# Patient Record
Sex: MALE | Race: WHITE | NOT HISPANIC OR LATINO | URBAN - METROPOLITAN AREA
[De-identification: names, ages, dates, MRNs, and addresses within clinical notes are randomized per-mention and may not be internally consistent; named-entity substitution may affect disease eponyms.]

---

## 2024-02-21 ENCOUNTER — EMERGENCY (EMERGENCY)
Facility: HOSPITAL | Age: 69
LOS: 1 days | Discharge: HOME | End: 2024-02-21
Attending: EMERGENCY MEDICINE | Admitting: EMERGENCY MEDICINE
Payer: MEDICARE

## 2024-02-21 VITALS
HEART RATE: 86 BPM | DIASTOLIC BLOOD PRESSURE: 85 MMHG | RESPIRATION RATE: 18 BRPM | SYSTOLIC BLOOD PRESSURE: 159 MMHG | TEMPERATURE: 98 F | OXYGEN SATURATION: 99 %

## 2024-02-21 PROCEDURE — 99284 EMERGENCY DEPT VISIT MOD MDM: CPT | Mod: 25,GC

## 2024-02-21 PROCEDURE — 93010 ELECTROCARDIOGRAM REPORT: CPT

## 2024-02-21 PROCEDURE — 12013 RPR F/E/E/N/L/M 2.6-5.0 CM: CPT | Mod: GC

## 2024-02-21 NOTE — ED ADULT TRIAGE NOTE - CHIEF COMPLAINT QUOTE
Pt c/o R forehead laceration s/p possible slip and fall. Reports has had unsteady gait x1 year. hx "neurological disorder?".  Denies LOC, chest pain, blood thinners, n/v

## 2024-02-22 VITALS
HEART RATE: 77 BPM | RESPIRATION RATE: 18 BRPM | DIASTOLIC BLOOD PRESSURE: 89 MMHG | SYSTOLIC BLOOD PRESSURE: 134 MMHG | TEMPERATURE: 99 F | OXYGEN SATURATION: 100 %

## 2024-02-22 PROCEDURE — 70450 CT HEAD/BRAIN W/O DYE: CPT | Mod: 26,MD

## 2024-02-22 RX ORDER — ACETAMINOPHEN 500 MG
975 TABLET ORAL ONCE
Refills: 0 | Status: COMPLETED | OUTPATIENT
Start: 2024-02-22 | End: 2024-02-22

## 2024-02-22 RX ORDER — LIDOCAINE HCL 20 MG/ML
20 VIAL (ML) INJECTION ONCE
Refills: 0 | Status: DISCONTINUED | OUTPATIENT
Start: 2024-02-22 | End: 2024-02-25

## 2024-02-22 NOTE — ED PROVIDER NOTE - PROGRESS NOTE DETAILS
Yulisa Landry MD; [facility] PGY4: CTH with NPH, discussed results with patient, has f/u with [facility] but will give information for [facility] neurology. Will noni.

## 2024-02-22 NOTE — ED PROVIDER NOTE - OBJECTIVE STATEMENT
68-year-old male past medical history of BPH, hypothyroidism, neurologic disorder that has not been fully worked up as of yet (causes gait disturbances, daytime somnolence, cognitive decline) here for fall from standing at approximately 9 PM.   Patient endorses that this is mechanical in nature states that he tripped over his own 2 feet fell forward and hit the right side of his forehead against the ground sustained laceration that was actively bleeding.  Patient is not on blood thinners, denies any loss of consciousness.  Able to ambulate after the event without difficulty.  Denies any changes in vision, changes in sensation in the face, neck pain. As per wife at b/l. 68-year-old male past medical history of BPH, hypothyroidism, neurologic disorder that has not been fully worked up as of yet (causes gait disturbances, daytime somnolence, cognitive decline) here for fall from standing at approximately 9 PM.   Patient endorses that this is mechanical in nature states that he tripped over his own 2 feet fell forward and hit the right side of his forehead against the ground sustained laceration that was actively bleeding.  Patient is not on blood thinners, denies any loss of consciousness.  Able to ambulate after the event without difficulty.  Denies any changes in vision, changes in sensation in the face, neck pain. As per wife at b/l. Last tetanus < 10 years ago. Has h/o empty sella syndrome on previous CT scans.

## 2024-02-22 NOTE — ED ADULT NURSE NOTE - OBJECTIVE STATEMENT
breakcoverage rn LAMARP: pt in ED 7a, wife at bedside. Pt aox4, calm smiling affect . Pt st" I tripped and fell hit my head on floor tonight. I did not black out I am not on blood thinners. I have no pain. " + laceration to rt side forehead. HOB elevated. Pt declining tylenol ordered awaits CT.

## 2024-02-22 NOTE — ED PROVIDER NOTE - NSFOLLOWUPCLINICS_GEN_ALL_ED_FT
[facility]  Neurology  [address] [place] [address] [address] [place] [address]  Phone: [phone]  Fax:

## 2024-02-22 NOTE — ED PROVIDER NOTE - PHYSICAL EXAMINATION
Gen: WDWN, NAD  HEENT: EOMI, no nasal discharge, mucous membranes moist  CV: RRR, +S1/S2, no M/R/G  Resp: CTAB, no W/R/R  GI: Abdomen soft non-distended, NTTP, no masses  MSK: 3 cm laceration of forehead right above r eyebrow not actively bleeding, superficial, no bruising, no LE edema  Neuro: A&Ox4, following commands, moving all four extremities spontaneously  Psych: appropriate mood, denies AH, VH, SI

## 2024-02-22 NOTE — ED PROCEDURE NOTE - ATTENDING CONTRIBUTION TO CARE
DR. KNOTT, ATTENDING MD-  I was in the exam room and observed and supervised the resident when they were completing the key portions of this procedure.

## 2024-02-22 NOTE — ED PROVIDER NOTE - NSFOLLOWUPINSTRUCTIONS_ED_ALL_ED_FT
-- Please follow up with your primary doctor (or come back to the Emergency Department) within 48-72 hours for wound check.   -- Keep sutures/staples covered & dry for 24 hours.  Afterwards, once a day, please clean the injury gently with unscented soap & water, apply bacitracin, and then re-cover the wound.  -- Return to Emergency Department for suture removal in  5-7 days.   -- Remember, a wound doesn't become even close to as strong as normal skin until 6 weeks after the injury.  Please take care to avoid any further trauma to the area to allow the wound to heal.  -- Any increased pain, redness, streaking (red lines), drainage from the wound, swelling, fever, chills please return right away to Emergency Department.  -- You were given a copy of the results from any tests performed today in the Emergency Department which have results available.  Show these to your doctor(s).   Some of the tests we sent may not have results yet so please call or have your doctor call the Emergency Department to follow up on all results.  -- Please continue taking your home medications as directed.  Do not use alcohol when taking any medication (especially antibiotics, tylenol or other pain medication) unless you check with the doctor or pharmacist.

## 2024-02-22 NOTE — ED PROCEDURE NOTE - CPROC ED LACER REPAIR DETAIL1
Acute hypoxic respiratory failure due to [id number].    Initially on NR 15 L now slowly improving, weaned off to 6 L--> 4 L  on optimizer currently saturating 95%   Completed Remdesevir and Dexamethasone  continue bronchodilation  monitor inflammatory markers  adjust oxygen to maintain saturation above 94%  Pt still feels sob on OOB  f/u CTA to r/o PE The wound was explored to base in bloodless field./No foreign body

## 2024-02-22 NOTE — ED PROVIDER NOTE - CLINICAL SUMMARY MEDICAL DECISION MAKING FREE TEXT BOX
68M PMH as above here for forehead laceration after mechanical FFS. VSS, PE as above. Plan for CTH, wound irrigation and reassessment. Likely dc however dispo pending clinical course.

## 2024-02-22 NOTE — ED PROVIDER NOTE - PATIENT PORTAL LINK FT
You can access the FollowMyHealth Patient Portal offered by [facility] by registering at the following website: [email or web address]. By joining [employer]’s FollowMyHealth portal, you will also be able to view your health information using other applications (apps) compatible with our system.

## 2024-02-22 NOTE — ED PROCEDURE NOTE - CPROC ED INFORMED CONSENT1
Benefits, risks, and possible complications of procedure explained to patient/caregiver who verbalized understanding and gave verbal consent. O-Z Flap Text: The defect edges were debeveled with a #15 scalpel blade.  Given the location of the defect, shape of the defect and the proximity to free margins an O-Z flap was deemed most appropriate.  Using a sterile surgical marker, an appropriate transposition flap was drawn incorporating the defect and placing the expected incisions within the relaxed skin tension lines where possible. The area thus outlined was incised deep to adipose tissue with a #15 scalpel blade.  The skin margins were undermined to an appropriate distance in all directions utilizing iris scissors.

## 2024-02-22 NOTE — ED PROVIDER NOTE - ATTENDING CONTRIBUTION TO CARE
HPI: 68-year-old male with past medical history significant for BPH, hypothyroidism, neurological disorder that has not been fully worked up as of yet leading to gait disturbances, daytime somnolence, cognitive decline that is here for fall from standing at approximately 9 PM which is approximately 3 hours prior to arrival.  Patient states that he was turning and had a mechanical fall on his 2 feet fell forward hit the right side of his forehead against the hardwood floor and right elbow.  Patient's wife heard him fall and found him immediately he was awake and alert no tongue biting or seizure-like activities noted.  She noted that his right forehead had a deep laceration and right elbow has an abrasion.  Patient is not on any blood thinners, denies any LOC, denies any other pain such as chest pain, shortness of breath, abdominal pain, nausea, vomiting, diarrhea.  Patient has been otherwise well in usual state of health prior to fall.  Since his fall he has had the ability to ambulate on his own without any difficulty.  Denies any changes in vision, no paresthesias, no neck pain.  Last tetanus was within the last 5 years.    EXAM: Well-appearing no acute distress.  Right elbow full range of motion with abrasion no laceration or open wounds otherwise.  No palpable step-offs.  Full range of motion at right shoulder, elbow, wrist.  Chest wall is stable no C/T/L-spine tenderness to palpation or step-offs.  Abdomen soft nontender.  Right forehead with 2.5 cm linear laceration.  No muscle involvement.  No tendon seen minimal oozing from wound.  No pus no foreign body.  Able to for all bowel.  CN II through XII are intact.  Gait is steady.    MDM: 68-year-old male with past medical history significant for BPH, hypothyroidism, neurological disorder leading to gait disturbances at baseline that had a mechanical slip and fall tonight leading to right forehead laceration and right elbow abrasion.  Patient is alert and awake.  Denies any other pains or symptoms.  Tdap within the last 5 years.  Will at this time provide pain medications obtain CT imaging of the head but not the cervical spine as patient has no cervical spinal tenderness.  Patient has no other neurological deficits or gross musculoskeletal deformities.  His cranial nerves are intact otherwise.  Will obtain CT imaging suture laceration provide pain medications and if normal CT head will be able to discharge home to follow-up with outpatient PMD.  Patient and wife are aware of plan.

## 2024-07-08 NOTE — ED ADULT TRIAGE NOTE - PAIN RATING/NUMBER SCALE (0-10): ACTIVITY
Refill must be reviewed and completed by the office or provider. The refill is unable to be approved or denied by the medication management team.      Patient last seen 5.2023, no appointment - Please review to see if the refill is appropriate.   
0 (no pain/absence of nonverbal indicators of pain)

## 2024-08-06 ENCOUNTER — INPATIENT (INPATIENT)
Facility: HOSPITAL | Age: 69
LOS: 2 days | Discharge: HOME | End: 2024-08-09
Attending: STUDENT IN AN ORGANIZED HEALTH CARE EDUCATION/TRAINING PROGRAM | Admitting: STUDENT IN AN ORGANIZED HEALTH CARE EDUCATION/TRAINING PROGRAM
Payer: MEDICARE

## 2024-08-06 VITALS
DIASTOLIC BLOOD PRESSURE: 75 MMHG | SYSTOLIC BLOOD PRESSURE: 132 MMHG | WEIGHT: 173.06 LBS | HEART RATE: 104 BPM | RESPIRATION RATE: 20 BRPM | HEIGHT: 68 IN | TEMPERATURE: 102 F | OXYGEN SATURATION: 100 %

## 2024-08-06 LAB
ADD ON TEST-SPECIMEN IN LAB: SIGNIFICANT CHANGE UP
ALBUMIN SERPL ELPH-MCNC: 4.1 G/DL — SIGNIFICANT CHANGE UP (ref 3.3–5)
ALP SERPL-CCNC: 104 U/L — SIGNIFICANT CHANGE UP (ref 40–120)
ALT FLD-CCNC: 39 U/L — SIGNIFICANT CHANGE UP (ref 4–41)
ANION GAP SERPL CALC-SCNC: 12 MMOL/L — SIGNIFICANT CHANGE UP (ref 7–14)
AST SERPL-CCNC: 28 U/L — SIGNIFICANT CHANGE UP (ref 4–40)
BASOPHILS # BLD AUTO: 0.02 K/UL — SIGNIFICANT CHANGE UP (ref 0–0.2)
BASOPHILS NFR BLD AUTO: 0.2 % — SIGNIFICANT CHANGE UP (ref 0–2)
BILIRUB SERPL-MCNC: 0.5 MG/DL — SIGNIFICANT CHANGE UP (ref 0.2–1.2)
BUN SERPL-MCNC: 17 MG/DL — SIGNIFICANT CHANGE UP (ref 7–23)
CALCIUM SERPL-MCNC: 9.2 MG/DL — SIGNIFICANT CHANGE UP (ref 8.4–10.5)
CHLORIDE SERPL-SCNC: 101 MMOL/L — SIGNIFICANT CHANGE UP (ref 98–107)
CO2 SERPL-SCNC: 26 MMOL/L — SIGNIFICANT CHANGE UP (ref 22–31)
CREAT SERPL-MCNC: 1.2 MG/DL — SIGNIFICANT CHANGE UP (ref 0.5–1.3)
EGFR: 65 ML/MIN/1.73M2 — SIGNIFICANT CHANGE UP
EOSINOPHIL # BLD AUTO: 0 K/UL — SIGNIFICANT CHANGE UP (ref 0–0.5)
EOSINOPHIL NFR BLD AUTO: 0 % — SIGNIFICANT CHANGE UP (ref 0–6)
FLUAV AG NPH QL: SIGNIFICANT CHANGE UP
FLUBV AG NPH QL: SIGNIFICANT CHANGE UP
GLUCOSE SERPL-MCNC: 109 MG/DL — HIGH (ref 70–99)
HCT VFR BLD CALC: 45.6 % — SIGNIFICANT CHANGE UP (ref 39–50)
HGB BLD-MCNC: 15 G/DL — SIGNIFICANT CHANGE UP (ref 13–17)
IANC: 7.02 K/UL — SIGNIFICANT CHANGE UP (ref 1.8–7.4)
IMM GRANULOCYTES NFR BLD AUTO: 0.4 % — SIGNIFICANT CHANGE UP (ref 0–0.9)
LYMPHOCYTES # BLD AUTO: 0.74 K/UL — LOW (ref 1–3.3)
LYMPHOCYTES # BLD AUTO: 8.8 % — LOW (ref 13–44)
MCHC RBC-ENTMCNC: 30.6 PG — SIGNIFICANT CHANGE UP (ref 27–34)
MCHC RBC-ENTMCNC: 32.9 GM/DL — SIGNIFICANT CHANGE UP (ref 32–36)
MCV RBC AUTO: 93.1 FL — SIGNIFICANT CHANGE UP (ref 80–100)
MONOCYTES # BLD AUTO: 0.58 K/UL — SIGNIFICANT CHANGE UP (ref 0–0.9)
MONOCYTES NFR BLD AUTO: 6.9 % — SIGNIFICANT CHANGE UP (ref 2–14)
NEUTROPHILS # BLD AUTO: 7.02 K/UL — SIGNIFICANT CHANGE UP (ref 1.8–7.4)
NEUTROPHILS NFR BLD AUTO: 83.7 % — HIGH (ref 43–77)
NRBC # BLD: 0 /100 WBCS — SIGNIFICANT CHANGE UP (ref 0–0)
NRBC # FLD: 0 K/UL — SIGNIFICANT CHANGE UP (ref 0–0)
PLATELET # BLD AUTO: 201 K/UL — SIGNIFICANT CHANGE UP (ref 150–400)
POTASSIUM SERPL-MCNC: 4.1 MMOL/L — SIGNIFICANT CHANGE UP (ref 3.5–5.3)
POTASSIUM SERPL-SCNC: 4.1 MMOL/L — SIGNIFICANT CHANGE UP (ref 3.5–5.3)
PROT SERPL-MCNC: 6.7 G/DL — SIGNIFICANT CHANGE UP (ref 6–8.3)
RBC # BLD: 4.9 M/UL — SIGNIFICANT CHANGE UP (ref 4.2–5.8)
RBC # FLD: 13.3 % — SIGNIFICANT CHANGE UP (ref 10.3–14.5)
RSV RNA NPH QL NAA+NON-PROBE: SIGNIFICANT CHANGE UP
SARS-COV-2 RNA SPEC QL NAA+PROBE: DETECTED
SODIUM SERPL-SCNC: 139 MMOL/L — SIGNIFICANT CHANGE UP (ref 135–145)
WBC # BLD: 8.39 K/UL — SIGNIFICANT CHANGE UP (ref 3.8–10.5)
WBC # FLD AUTO: 8.39 K/UL — SIGNIFICANT CHANGE UP (ref 3.8–10.5)

## 2024-08-06 PROCEDURE — 73522 X-RAY EXAM HIPS BI 3-4 VIEWS: CPT | Mod: 26

## 2024-08-06 PROCEDURE — 99285 EMERGENCY DEPT VISIT HI MDM: CPT

## 2024-08-06 PROCEDURE — 71046 X-RAY EXAM CHEST 2 VIEWS: CPT | Mod: 26

## 2024-08-06 PROCEDURE — 72170 X-RAY EXAM OF PELVIS: CPT | Mod: 26,XE

## 2024-08-06 RX ORDER — AZITHROMYCIN 250 MG
500 TABLET ORAL ONCE
Refills: 0 | Status: COMPLETED | OUTPATIENT
Start: 2024-08-06 | End: 2024-08-06

## 2024-08-06 RX ORDER — ACETAMINOPHEN 500 MG
650 TABLET ORAL ONCE
Refills: 0 | Status: COMPLETED | OUTPATIENT
Start: 2024-08-06 | End: 2024-08-06

## 2024-08-06 RX ADMIN — Medication 255 MILLIGRAM(S): at 22:31

## 2024-08-06 RX ADMIN — Medication 100 MILLIGRAM(S): at 21:34

## 2024-08-06 RX ADMIN — Medication 650 MILLIGRAM(S): at 23:27

## 2024-08-06 NOTE — ED ADULT NURSE NOTE - OBJECTIVE STATEMENT
This is a 69 y/o male complaining of cough and fever for a few days. Alert and oriented x 4. Past medical history of hypothyroid and BPH. Pale looking, no SOB noted, chest is clear bilaterally, abdomen is soft non tender on palpation. IV initiated on left AC g 20 blood work sent to lab. Denies urinary symptoms, normal bowel movement as stated by patient. Ambulatory no assist noted.

## 2024-08-06 NOTE — ED PROVIDER NOTE - PROGRESS NOTE DETAILS
Patient with covid 19 and pna. Offered patient admission vs discharge but would prefer admission as had issues with covid in the past. Discussed with hospitalist. TBA.  Peter Moreno PGY-3

## 2024-08-06 NOTE — ED PROVIDER NOTE - PHYSICAL EXAMINATION
GEN - NAD; well appearing; A+O x3   HEAD - NC/AT   EYES- PERRL, EOMI  ENT: Airway patent, mmm  NECK: Neck supple  PULMONARY - crackles at bases otherwise clear   CARDIAC -s1s2, RRR, no M,G,R  ABDOMEN - +BS, ND, NT, soft, no guarding, no rebound, no masses   EXTREMITIES - FROM  NEUROLOGIC - alert, speech clear  PSYCH -nl mood/affect, nl insight.

## 2024-08-06 NOTE — ED PROVIDER NOTE - CLINICAL SUMMARY MEDICAL DECISION MAKING FREE TEXT BOX
With history of hypothyroid, NPH with  shunt presents the emergency department with 3 days of fatigue, nasal congestion, dry cough.  Patient presents with his wife who states that earlier today he was very lethargic and had difficulty staying awake.  He is currently mentating appropriately, nonfocal neurological exam, denies headache, neck pain.  Appears nontoxic but does have COVID, was swabbed at home which came back positive according to wife.  No respiratory distress here, plan for chest x-ray to evaluate for possible overlying pneumonia, basic labs to evaluate electrolytes, kidney function, blood counts, will also give IV hydration as patient has not had anything to eat today.

## 2024-08-06 NOTE — ED ADULT TRIAGE NOTE - CHIEF COMPLAINT QUOTE
Pt arrives with EMS. As per wife, patient became weak and felt like he had a fever. Pt wife says she tested her  and he was found to be COVID positive. Pt was not responding as he usually does and is febrile in triage.  Speech clear at this time. Pt starting to answer questions. Hx  shunt 6/24, hypothyroidism.

## 2024-08-06 NOTE — ED ADULT NURSE REASSESSMENT NOTE - NS ED NURSE REASSESS COMMENT FT1
pt able to walk with steady gait, ambulatory o2 sat 97% on RA, RR even and unlabored, denies sob upon exertion. iv abx infusing well without complications, iv site WNL. pt requesting po tylenol d/t facial pain. tolerated po med well with water, no coughing noted. safety measures maintained, wife at bedside

## 2024-08-06 NOTE — ED ADULT NURSE REASSESSMENT NOTE - NS ED NURSE REASSESS COMMENT FT1
report received from day shift ED RN. pt resting in stretcher in NAD, A&OX4, RR even and unlabored. denies pain or sob at this time. o2 sat 97% when laying down. safety measures maintained, side rails up x2. wife at bedside. proper precaution signage posted on door.

## 2024-08-06 NOTE — ED ADULT NURSE REASSESSMENT NOTE - NS ED NURSE REASSESS COMMENT FT1
Patient taken fro x-ray via stretcher. Vital sing stable, afebrile. No verbal complaints at this time.

## 2024-08-07 DIAGNOSIS — Z29.9 ENCOUNTER FOR PROPHYLACTIC MEASURES, UNSPECIFIED: ICD-10-CM

## 2024-08-07 DIAGNOSIS — G91.2 (IDIOPATHIC) NORMAL PRESSURE HYDROCEPHALUS: ICD-10-CM

## 2024-08-07 DIAGNOSIS — Z79.899 OTHER LONG TERM (CURRENT) DRUG THERAPY: ICD-10-CM

## 2024-08-07 DIAGNOSIS — U07.1 COVID-19: ICD-10-CM

## 2024-08-07 DIAGNOSIS — A41.9 SEPSIS, UNSPECIFIED ORGANISM: ICD-10-CM

## 2024-08-07 DIAGNOSIS — R63.8 OTHER SYMPTOMS AND SIGNS CONCERNING FOOD AND FLUID INTAKE: ICD-10-CM

## 2024-08-07 DIAGNOSIS — E03.9 HYPOTHYROIDISM, UNSPECIFIED: ICD-10-CM

## 2024-08-07 DIAGNOSIS — J13 PNEUMONIA DUE TO STREPTOCOCCUS PNEUMONIAE: ICD-10-CM

## 2024-08-07 DIAGNOSIS — Z98.2 PRESENCE OF CEREBROSPINAL FLUID DRAINAGE DEVICE: Chronic | ICD-10-CM

## 2024-08-07 LAB
MRSA PCR RESULT.: SIGNIFICANT CHANGE UP
S AUREUS DNA NOSE QL NAA+PROBE: SIGNIFICANT CHANGE UP

## 2024-08-07 PROCEDURE — 71250 CT THORAX DX C-: CPT | Mod: 26

## 2024-08-07 PROCEDURE — 99223 1ST HOSP IP/OBS HIGH 75: CPT

## 2024-08-07 RX ORDER — MODAFINIL 200 MG
1.5 TABLET ORAL
Refills: 0 | DISCHARGE

## 2024-08-07 RX ORDER — IBUPROFEN 200 MG
400 TABLET ORAL EVERY 6 HOURS
Refills: 0 | Status: DISCONTINUED | OUTPATIENT
Start: 2024-08-07 | End: 2024-08-09

## 2024-08-07 RX ORDER — MELATONIN 3 MG
3 TABLET ORAL AT BEDTIME
Refills: 0 | Status: DISCONTINUED | OUTPATIENT
Start: 2024-08-07 | End: 2024-08-09

## 2024-08-07 RX ORDER — AZITHROMYCIN 250 MG
500 TABLET ORAL EVERY 24 HOURS
Refills: 0 | Status: DISCONTINUED | OUTPATIENT
Start: 2024-08-07 | End: 2024-08-09

## 2024-08-07 RX ORDER — LEVOTHYROXINE SODIUM 175 MCG
50 TABLET ORAL DAILY
Refills: 0 | Status: DISCONTINUED | OUTPATIENT
Start: 2024-08-07 | End: 2024-08-09

## 2024-08-07 RX ORDER — LEVOTHYROXINE SODIUM 175 MCG
1 TABLET ORAL
Refills: 0 | DISCHARGE

## 2024-08-07 RX ORDER — MELATONIN 3 MG
3 TABLET ORAL AT BEDTIME
Refills: 0 | Status: DISCONTINUED | OUTPATIENT
Start: 2024-08-07 | End: 2024-08-07

## 2024-08-07 RX ORDER — BENZONATATE 100 MG/1
100 CAPSULE, LIQUID FILLED ORAL THREE TIMES A DAY
Refills: 0 | Status: DISCONTINUED | OUTPATIENT
Start: 2024-08-07 | End: 2024-08-07

## 2024-08-07 RX ORDER — DUTASTERIDE 0.5 MG/1
1 CAPSULE, LIQUID FILLED ORAL
Refills: 0 | DISCHARGE

## 2024-08-07 RX ORDER — ACETAMINOPHEN 500 MG
650 TABLET ORAL EVERY 6 HOURS
Refills: 0 | Status: DISCONTINUED | OUTPATIENT
Start: 2024-08-07 | End: 2024-08-09

## 2024-08-07 RX ORDER — MAGNESIUM, ALUMINUM HYDROXIDE 200-225/5
30 SUSPENSION, ORAL (FINAL DOSE FORM) ORAL EVERY 4 HOURS
Refills: 0 | Status: DISCONTINUED | OUTPATIENT
Start: 2024-08-07 | End: 2024-08-07

## 2024-08-07 RX ORDER — ENOXAPARIN SODIUM 120 MG/.8ML
40 INJECTION SUBCUTANEOUS EVERY 24 HOURS
Refills: 0 | Status: DISCONTINUED | OUTPATIENT
Start: 2024-08-07 | End: 2024-08-09

## 2024-08-07 RX ORDER — RITONAVIR 100 MG 100 MG/1
100 TABLET ORAL
Refills: 0 | Status: DISCONTINUED | OUTPATIENT
Start: 2024-08-07 | End: 2024-08-07

## 2024-08-07 RX ORDER — ALBUTEROL 90 MCG
2 AEROSOL REFILL (GRAM) INHALATION EVERY 6 HOURS
Refills: 0 | Status: DISCONTINUED | OUTPATIENT
Start: 2024-08-07 | End: 2024-08-09

## 2024-08-07 RX ORDER — ONDANSETRON HCL/PF 4 MG/2 ML
4 VIAL (ML) INJECTION EVERY 8 HOURS
Refills: 0 | Status: DISCONTINUED | OUTPATIENT
Start: 2024-08-07 | End: 2024-08-07

## 2024-08-07 RX ORDER — BUPROPION HCL 100 MG
1 TABLET,SUSTAINED-RELEASE 12 HR ORAL
Refills: 0 | DISCHARGE

## 2024-08-07 RX ADMIN — Medication 650 MILLIGRAM(S): at 22:17

## 2024-08-07 RX ADMIN — Medication 255 MILLIGRAM(S): at 22:09

## 2024-08-07 RX ADMIN — Medication 650 MILLIGRAM(S): at 23:00

## 2024-08-07 RX ADMIN — ENOXAPARIN SODIUM 40 MILLIGRAM(S): 120 INJECTION SUBCUTANEOUS at 07:30

## 2024-08-07 RX ADMIN — Medication 100 MILLIGRAM(S): at 21:24

## 2024-08-07 RX ADMIN — Medication 50 MICROGRAM(S): at 07:30

## 2024-08-07 NOTE — PHYSICAL THERAPY INITIAL EVALUATION ADULT - ADDITIONAL COMMENTS
Pt lives in an apartment with wife, +1 flight walk-up, was independent with all functional mobility and ADL performance using a Single Axis Cane only for long distances in the community, otherwise does not use a device.     Pt left semi-supine in bed, all lines intact, all needs in reach, bed alarm set, in NAD. BHANU Duong aware. Heart rate 72 beats per minute

## 2024-08-07 NOTE — PATIENT PROFILE ADULT - FALL HARM RISK - HARM RISK INTERVENTIONS

## 2024-08-07 NOTE — PHYSICAL THERAPY INITIAL EVALUATION ADULT - PERTINENT HX OF CURRENT PROBLEM, REHAB EVAL
68 year old Male with past medical history stated below, presented to the ED for new onset lethargy for the last few days. Patient found to have COVID infection with pneumonia Admit for IV antibiotics

## 2024-08-07 NOTE — H&P ADULT - HISTORY OF PRESENT ILLNESS
This is a 67 y/o M with pmhx of hypothyroidism, NPH presented to the ED for new onset lethargy for the last few days. The patient was more lethargic and sleepy than usual. He had difficulty holding a conversation because he kept falling asleep. The patient also has subjective fevers. Also endorsed new onset nasal congestion and cough. Cough is nonproductive. The patient had a covid test at home and tested positive. Denies SOB. ROS otherwise negative. No family members is sick at home.

## 2024-08-07 NOTE — H&P ADULT - NSHPLABSRESULTS_GEN_ALL_CORE
15.0   8.39  )-----------( 201      ( 06 Aug 2024 18:20 )             45.6       08-06    139  |  101  |  17  ----------------------------<  109<H>  4.1   |  26  |  1.20    Ca    9.2      06 Aug 2024 18:20    TPro  6.7  /  Alb  4.1  /  TBili  0.5  /  DBili  x   /  AST  28  /  ALT  39  /  AlkPhos  104  08-06                    Urinalysis Basic - ( 06 Aug 2024 18:20 )    Color: x / Appearance: x / SG: x / pH: x  Gluc: 109 mg/dL / Ketone: x  / Bili: x / Urobili: x   Blood: x / Protein: x / Nitrite: x   Leuk Esterase: x / RBC: x / WBC x   Sq Epi: x / Non Sq Epi: x / Bacteria: x            CXR: As per my read - b/l hazy lower opacities, Will wait for prelim/official read    EKG: As per my read - sinus tach QTc 444 ms

## 2024-08-07 NOTE — H&P ADULT - TIME BILLING
with patient I have spent a total of greater than 76 minutes time spent to prepare to see the patient, including the Emergency room charts and ED progress notes, obtaining and reviewing history, physical examination, explaining the diagnosis, prognosis and treatment plan with the patient/family/caregiver. I also have spent the time ordering studies and testing, interpreting results, medicine reconciliation, subspecialty consultations as indicated and documentation as above.

## 2024-08-07 NOTE — H&P ADULT - PROBLEM SELECTOR PLAN 2
- isolation precautions  - patient currently on room air - no indication for remdesivir or dexamethasone  - monitor respiratory status

## 2024-08-07 NOTE — H&P ADULT - NSHPPHYSICALEXAM_GEN_ALL_CORE
PHYSICAL EXAM:  VITALS: Vital Signs Last 24 Hrs  T(C): 37.2 (06 Aug 2024 23:56), Max: 38.8 (06 Aug 2024 14:44)  T(F): 98.9 (06 Aug 2024 23:56), Max: 101.8 (06 Aug 2024 14:44)  HR: 89 (06 Aug 2024 23:56) (87 - 104)  BP: 109/78 (06 Aug 2024 23:56) (109/78 - 132/75)  BP(mean): --  RR: 17 (06 Aug 2024 23:56) (16 - 20)  SpO2: 97% (06 Aug 2024 23:56) (96% - 100%)    Parameters below as of 06 Aug 2024 23:56  Patient On (Oxygen Delivery Method): room air      GENERAL: NAD, comfortable at bedside  HEAD:  Atraumatic, Normocephalic  EYES: EOMI, PERRL, conjunctiva and sclera clear  ENT: Moist Mucus Membranes present, no ulcers appreciated  NECK: Supple, No JVD  CHEST/LUNG: bibasilar crackles in the lower lobes b/l, no accessory muscle use  HEART: Regular rate and rhythm; No murmurs, rubs, or gallops, (+)S1, S2  ABDOMEN: Soft, Nontender, Nondistended; Normal Bowel sounds   EXTREMITIES: No clubbing, cyanosis, or edema  PSYCH: normal mood and affect  NEUROLOGY: AAOx3, non-focal  SKIN: No rashes or lesions

## 2024-08-07 NOTE — H&P ADULT - NSHPREVIEWOFSYSTEMS_GEN_ALL_CORE
REVIEW OF SYSTEMS:    CONSTITUTIONAL: No weakness, fevers or chills  EYES/ENT: No visual changes;  No dysphagia; No sore throat; + rhinorrhea; no sinus pain/pressure  NECK: No pain or stiffness  RESPIRATORY: + cough, no wheezing, no hemoptysis; No shortness of breath  CARDIOVASCULAR: No chest pain or palpitations; No lower extremity edema  GASTROINTESTINAL: No abdominal or epigastric pain. No nausea, vomiting, or hematemesis; No diarrhea or constipation. No melena or hematochezia.  GENITOURINARY: No dysuria, frequency or hematuria  NEUROLOGICAL: No numbness or weakness  MSK: + generalized muscle pains  SKIN: No itching, burning, rashes, or lesions   All other review of systems is negative unless indicated above.

## 2024-08-07 NOTE — ED ADULT NURSE REASSESSMENT NOTE - NS ED NURSE REASSESS COMMENT FT1
pt transported up via stretcher in NAD, RR even and unlabored. side rails up x2, mask in place. all belongings with pt

## 2024-08-07 NOTE — H&P ADULT - PROBLEM SELECTOR PLAN 1
- patient found to have pneumonia  - ct chest pending  - will start empiric abx - ceftriaxone and azithro  - sputum cultures, blood cultures pending  - legionella, strept pneumo ag pending  - monitor respiratory status

## 2024-08-07 NOTE — H&P ADULT - ASSESSMENT
67 y/o M with pmhx of hypothyroidism, NPH presented to the ED for new onset lethargy for the last few days. Patient found to have covid infection with pneumonia Admit for IV abx.

## 2024-08-08 LAB
A1C WITH ESTIMATED AVERAGE GLUCOSE RESULT: 5 % — SIGNIFICANT CHANGE UP (ref 4–5.6)
ANION GAP SERPL CALC-SCNC: 12 MMOL/L — SIGNIFICANT CHANGE UP (ref 7–14)
BASOPHILS # BLD AUTO: 0.04 K/UL — SIGNIFICANT CHANGE UP (ref 0–0.2)
BASOPHILS NFR BLD AUTO: 0.9 % — SIGNIFICANT CHANGE UP (ref 0–2)
BUN SERPL-MCNC: 12 MG/DL — SIGNIFICANT CHANGE UP (ref 7–23)
CALCIUM SERPL-MCNC: 8.6 MG/DL — SIGNIFICANT CHANGE UP (ref 8.4–10.5)
CHLORIDE SERPL-SCNC: 103 MMOL/L — SIGNIFICANT CHANGE UP (ref 98–107)
CO2 SERPL-SCNC: 25 MMOL/L — SIGNIFICANT CHANGE UP (ref 22–31)
CREAT SERPL-MCNC: 1.02 MG/DL — SIGNIFICANT CHANGE UP (ref 0.5–1.3)
CRP SERPL-MCNC: 108.4 MG/L — HIGH
D DIMER BLD IA.RAPID-MCNC: 356 NG/ML DDU — HIGH
EGFR: 79 ML/MIN/1.73M2 — SIGNIFICANT CHANGE UP
EOSINOPHIL # BLD AUTO: 0.1 K/UL — SIGNIFICANT CHANGE UP (ref 0–0.5)
EOSINOPHIL NFR BLD AUTO: 2.3 % — SIGNIFICANT CHANGE UP (ref 0–6)
ESTIMATED AVERAGE GLUCOSE: 97 — SIGNIFICANT CHANGE UP
FERRITIN SERPL-MCNC: 196 NG/ML — SIGNIFICANT CHANGE UP (ref 30–400)
GLUCOSE SERPL-MCNC: 94 MG/DL — SIGNIFICANT CHANGE UP (ref 70–99)
HCT VFR BLD CALC: 39.4 % — SIGNIFICANT CHANGE UP (ref 39–50)
HGB BLD-MCNC: 13.4 G/DL — SIGNIFICANT CHANGE UP (ref 13–17)
IANC: 2.56 K/UL — SIGNIFICANT CHANGE UP (ref 1.8–7.4)
IMM GRANULOCYTES NFR BLD AUTO: 0.2 % — SIGNIFICANT CHANGE UP (ref 0–0.9)
LYMPHOCYTES # BLD AUTO: 1.05 K/UL — SIGNIFICANT CHANGE UP (ref 1–3.3)
LYMPHOCYTES # BLD AUTO: 24.2 % — SIGNIFICANT CHANGE UP (ref 13–44)
MAGNESIUM SERPL-MCNC: 1.9 MG/DL — SIGNIFICANT CHANGE UP (ref 1.6–2.6)
MCHC RBC-ENTMCNC: 30.9 PG — SIGNIFICANT CHANGE UP (ref 27–34)
MCHC RBC-ENTMCNC: 34 GM/DL — SIGNIFICANT CHANGE UP (ref 32–36)
MCV RBC AUTO: 91 FL — SIGNIFICANT CHANGE UP (ref 80–100)
MONOCYTES # BLD AUTO: 0.58 K/UL — SIGNIFICANT CHANGE UP (ref 0–0.9)
MONOCYTES NFR BLD AUTO: 13.4 % — SIGNIFICANT CHANGE UP (ref 2–14)
NEUTROPHILS # BLD AUTO: 2.56 K/UL — SIGNIFICANT CHANGE UP (ref 1.8–7.4)
NEUTROPHILS NFR BLD AUTO: 59 % — SIGNIFICANT CHANGE UP (ref 43–77)
NRBC # BLD: 0 /100 WBCS — SIGNIFICANT CHANGE UP (ref 0–0)
NRBC # FLD: 0 K/UL — SIGNIFICANT CHANGE UP (ref 0–0)
PHOSPHATE SERPL-MCNC: 2.7 MG/DL — SIGNIFICANT CHANGE UP (ref 2.5–4.5)
PLATELET # BLD AUTO: 170 K/UL — SIGNIFICANT CHANGE UP (ref 150–400)
POTASSIUM SERPL-MCNC: 4 MMOL/L — SIGNIFICANT CHANGE UP (ref 3.5–5.3)
POTASSIUM SERPL-SCNC: 4 MMOL/L — SIGNIFICANT CHANGE UP (ref 3.5–5.3)
PROCALCITONIN SERPL-MCNC: 0.1 NG/ML — SIGNIFICANT CHANGE UP (ref 0.02–0.1)
RBC # BLD: 4.33 M/UL — SIGNIFICANT CHANGE UP (ref 4.2–5.8)
RBC # FLD: 13 % — SIGNIFICANT CHANGE UP (ref 10.3–14.5)
SODIUM SERPL-SCNC: 140 MMOL/L — SIGNIFICANT CHANGE UP (ref 135–145)
WBC # BLD: 4.34 K/UL — SIGNIFICANT CHANGE UP (ref 3.8–10.5)
WBC # FLD AUTO: 4.34 K/UL — SIGNIFICANT CHANGE UP (ref 3.8–10.5)

## 2024-08-08 PROCEDURE — 99232 SBSQ HOSP IP/OBS MODERATE 35: CPT

## 2024-08-08 RX ORDER — FINASTERIDE 5 MG/1
5 TABLET, FILM COATED ORAL DAILY
Refills: 0 | Status: DISCONTINUED | OUTPATIENT
Start: 2024-08-08 | End: 2024-08-09

## 2024-08-08 RX ORDER — BUPROPION HCL 100 MG
450 TABLET,SUSTAINED-RELEASE 12 HR ORAL DAILY
Refills: 0 | Status: DISCONTINUED | OUTPATIENT
Start: 2024-08-08 | End: 2024-08-09

## 2024-08-08 RX ORDER — GUAIFENESIN 100 MG/5ML
600 SOLUTION ORAL EVERY 12 HOURS
Refills: 0 | Status: DISCONTINUED | OUTPATIENT
Start: 2024-08-08 | End: 2024-08-09

## 2024-08-08 RX ORDER — MODAFINIL 200 MG
300 TABLET ORAL DAILY
Refills: 0 | Status: DISCONTINUED | OUTPATIENT
Start: 2024-08-08 | End: 2024-08-09

## 2024-08-08 RX ADMIN — Medication 1 SPRAY(S): at 17:47

## 2024-08-08 RX ADMIN — Medication 255 MILLIGRAM(S): at 22:14

## 2024-08-08 RX ADMIN — ENOXAPARIN SODIUM 40 MILLIGRAM(S): 120 INJECTION SUBCUTANEOUS at 08:57

## 2024-08-08 RX ADMIN — Medication 50 MICROGRAM(S): at 05:04

## 2024-08-08 RX ADMIN — Medication 100 MILLIGRAM(S): at 22:13

## 2024-08-08 RX ADMIN — Medication 3 MILLIGRAM(S): at 22:14

## 2024-08-09 VITALS
HEART RATE: 71 BPM | OXYGEN SATURATION: 96 % | DIASTOLIC BLOOD PRESSURE: 77 MMHG | RESPIRATION RATE: 16 BRPM | TEMPERATURE: 98 F | SYSTOLIC BLOOD PRESSURE: 139 MMHG

## 2024-08-09 PROCEDURE — 99239 HOSP IP/OBS DSCHRG MGMT >30: CPT

## 2024-08-09 RX ORDER — AZITHROMYCIN 250 MG
1 TABLET ORAL
Qty: 2 | Refills: 0
Start: 2024-08-09 | End: 2024-08-10

## 2024-08-09 RX ORDER — ALBUTEROL 90 MCG
2 AEROSOL REFILL (GRAM) INHALATION
Qty: 1 | Refills: 0
Start: 2024-08-09 | End: 2024-08-15

## 2024-08-09 RX ORDER — CEFDINIR 300 MG/1
1 CAPSULE ORAL
Qty: 4 | Refills: 0
Start: 2024-08-09 | End: 2024-08-10

## 2024-08-09 RX ORDER — GUAIFENESIN 100 MG/5ML
1 SOLUTION ORAL
Qty: 4 | Refills: 0
Start: 2024-08-09 | End: 2024-08-10

## 2024-08-09 RX ADMIN — Medication 450 MILLIGRAM(S): at 11:48

## 2024-08-09 RX ADMIN — Medication 50 MICROGRAM(S): at 04:40

## 2024-08-09 RX ADMIN — Medication 650 MILLIGRAM(S): at 01:44

## 2024-08-09 RX ADMIN — Medication 650 MILLIGRAM(S): at 00:44

## 2024-08-09 RX ADMIN — Medication 300 MILLIGRAM(S): at 11:55

## 2024-08-09 NOTE — PROGRESS NOTE ADULT - TIME BILLING
review of laboratory data, radiology results, consultants' recommendations, documentation in [place], discussion with patient/ACP and interdisciplinary staff (such as , social workers, etc). Interventions were performed as documented above.
review of laboratory data, radiology results, consultants' recommendations, documentation in [place], discussion with patient/ACP and interdisciplinary staff (such as , social workers, etc). Interventions were performed as documented above.

## 2024-08-09 NOTE — PROGRESS NOTE ADULT - PROBLEM SELECTOR PLAN 5
- F: none  - E: replete K<4, Mg<2  - N: regular diet  - D: lovenox 40mg q24h  - G: none    code: full  dispo: PT recs no needs; DC home today
- F: none  - E: replete K<4, Mg<2  - N: regular diet  - D: lovenox 40mg q24h  - G: none    code: full  dispo: pending medical optimization, PT recs no needs
- F: none  - E: replete K<4, Mg<2  - N: regular diet  - D: lovenox 40mg q24h  - G: none    code: full  dispo: pending medical optimization, PT recs no needs; likely DC home 8/9

## 2024-08-09 NOTE — DISCHARGE NOTE PROVIDER - NSDCMRMEDTOKEN_GEN_ALL_CORE_FT
albuterol 90 mcg/inh inhalation aerosol: 2 puff(s) inhaled every 6 hours as needed for Shortness of Breath and/or Wheezing  azithromycin 500 mg oral tablet: 1 tab(s) orally once a day  buPROPion 450 mg/24 hours (XL) oral tablet, extended release: 1 tab(s) orally once a day  cefdinir 300 mg oral capsule: 1 cap(s) orally 2 times a day  dutasteride 0.5 mg oral capsule: 1 cap(s) orally once a day  fluticasone 50 mcg/inh nasal spray: 1 spray(s) nasal 2 times a day  guaiFENesin 600 mg oral tablet, extended release: 1 tab(s) orally every 12 hours  levothyroxine 50 mcg (0.05 mg) oral tablet: 1 tab(s) orally once a day  modafinil 200 mg oral tablet: 1.5 tab(s) orally once a day

## 2024-08-09 NOTE — DISCHARGE NOTE PROVIDER - NSDCCPTREATMENT_GEN_ALL_CORE_FT
PRINCIPAL PROCEDURE  Procedure: Chest xray, PA & lateral  Findings and Treatment: Bibasilar hazy opacities, greater on right than left consistent with   multifocal pneumonia.  The heart is normal in size.  There is no pneumothorax or pleuraleffusion.  IMPRESSION: Bibasilar airspace opacities consistent with pneumonia.        SECONDARY PROCEDURE  Procedure: CT chest w con  Findings and Treatment: Lungs/Airways/Pleura: The central airways are patent. No pleural   effusion. There are bandlike opacities in the lingula and bilateral lower   lobes. Small calcification granulomas in the superior segment left lower   lobe.  Mediastinum/Lymph nodes: No thoracic adenopathy.  Heart and Vessels: The great vessels are normal in size. The heart is   normal insize. No pericardial effusion.  Upper Abdomen: Unremarkable.  Osseous structures and Soft Tissues: No aggressive bone lesions.   Bilateral posterior chest wall lipomas with thin internal septations.  IMPRESSION:  Bandlike opacities in the lingula and bilateral lower lobes have the   appearance of atelectasis on this noncontrast CT. Consider pneumonia in   the appropriate clinical setting.      Procedure: XR pelvis, 1 view  Findings and Treatment: No acute fracture. No dislocation. Prominent lower lumbar spondylosis.   Mild to moderate bilateral hip arthrosis, greater on the right.  IMPRESSION:  No acute fractures or dislocations.

## 2024-08-09 NOTE — PROGRESS NOTE ADULT - SUBJECTIVE AND OBJECTIVE BOX
[facility] Department of Hospital Medicine  Korina Coffey DO  Available on MS Teams  Pager: [phone]    Patient is a 68y old  Male who presents with a chief complaint of lethargy (07 Aug 2024 04:19)    Subjective:  Pt seen and examined at bedside resting comfortably. Denies complaints. Asks about w/u thus far -- discussed CXR findings and plan for CT chest. Cough is improved and denies SOB. Does not feel strongly about remdesivir treatment. Was a psychiatrist, now retired.    VITAL SIGNS:  T(C): 36.8 (08-07-24 @ 05:17), Max: 37.4 (08-06-24 @ 16:48)  T(F): 98.2 (08-07-24 @ 05:17), Max: 99.3 (08-06-24 @ 16:48)  HR: 72 (08-07-24 @ 05:17) (72 - 91)  BP: 133/72 (08-07-24 @ 05:17) (109/78 - 133/72)  BP(mean): --  RR: 18 (08-07-24 @ 05:17) (16 - 19)  SpO2: 100% (08-07-24 @ 05:17) (96% - 100%)  Wt(kg): --    PHYSICAL EXAM:  Constitutional: resting comfortably in bed; NAD; appears tired   Head: NC/AT  Eyes: PERRL, EOMI, anicteric sclera  ENT: no nasal discharge; MMM  Neck: supple; no JVD  Respiratory: CTA B/L; no W/R/R; comfortable on RA  Cardiac: +S1/S2; RRR; no M/R/G  Gastrointestinal: soft, NT/ND; no rebound or guarding; +BSx4  Extremities: WWP, no clubbing or cyanosis; no peripheral edema  Musculoskeletal: NROM x4; no joint swelling, tenderness or erythema  Vascular: 2+ radial, DP/PT pulses B/L  Dermatologic: skin warm, dry and intact; no rashes, wounds, or scars  Neurologic: AAOx3; CNII-XII grossly intact; no focal deficits  Psychiatric: affect and characteristics of appearance, verbalizations, behaviors are appropriate    MEDICATIONS  (STANDING):  azithromycin  IVPB 500 milliGRAM(s) IV Intermittent every 24 hours  cefTRIAXone   IVPB 1000 milliGRAM(s) IV Intermittent every 24 hours  enoxaparin Injectable 40 milliGRAM(s) SubCutaneous every 24 hours  levothyroxine 50 MICROGram(s) Oral daily  melatonin 3 milliGRAM(s) Oral at bedtime    MEDICATIONS  (PRN):  acetaminophen     Tablet .. 650 milliGRAM(s) Oral every 6 hours PRN Temp greater or equal to 38C (100.4F), Mild Pain (1 - 3)  albuterol    90 MICROgram(s) HFA Inhaler 2 Puff(s) Inhalation every 6 hours PRN Shortness of Breath and/or Wheezing  hydrocodone/homatropine Syrup 5 milliLiter(s) Oral every 6 hours PRN Cough  ibuprofen  Tablet. 400 milliGRAM(s) Oral every 6 hours PRN Moderate Pain (4 - 6)    LABS:                        15.0   8.39  )-----------( 201      ( 06 Aug 2024 18:20 )             45.6     08-06    139  |  101  |  17  ----------------------------<  109<H>  4.1   |  26  |  1.20    Ca    9.2      06 Aug 2024 18:20    TPro  6.7  /  Alb  4.1  /  TBili  0.5  /  DBili  x   /  AST  28  /  ALT  39  /  AlkPhos  104  08-06      Urinalysis Basic - ( 06 Aug 2024 18:20 )    Color: x / Appearance: x / SG: x / pH: x  Gluc: 109 mg/dL / Ketone: x  / Bili: x / Urobili: x   Blood: x / Protein: x / Nitrite: x   Leuk Esterase: x / RBC: x / WBC x   Sq Epi: x / Non Sq Epi: x / Bacteria: x      CAPILLARY BLOOD GLUCOSE          RADIOLOGY & ADDITIONAL TESTS: Reviewed.    
[facility] Department of Hospital Medicine  Korina Coffey DO  Available on MS Teams  Pager: [phone]    Patient is a 68y old  Male who presents with a chief complaint of lethargy (07 Aug 2024 04:19)    Subjective:  Pt seen and examined at bedside resting comfortably. Denies complaints. Feeling better. Not needing cough syrup. Called wife on cell phone who agreed he sounded better. Likely plan for DC home tomorrow.    Vital Signs Last 24 Hrs  T(C): 36.8 (08 Aug 2024 05:20), Max: 37.8 (07 Aug 2024 21:20)  T(F): 98.2 (08 Aug 2024 05:20), Max: 100 (07 Aug 2024 21:20)  HR: 76 (08 Aug 2024 05:20) (76 - 88)  BP: 131/80 (08 Aug 2024 05:20) (127/84 - 132/80)  BP(mean): --  RR: 17 (08 Aug 2024 05:20) (17 - 18)  SpO2: 100% (08 Aug 2024 05:20) (96% - 100%)    Parameters below as of 08 Aug 2024 05:20  Patient On (Oxygen Delivery Method): room air    PHYSICAL EXAM:  Constitutional: resting comfortably in bed; NAD   Head: NC/AT  Eyes: PERRL, EOMI, anicteric sclera  ENT: no nasal discharge; MMM  Neck: supple; no JVD  Respiratory: CTA B/L; no W/R/R; comfortable on RA  Cardiac: +S1/S2; RRR; no M/R/G  Gastrointestinal: soft, NT/ND; no rebound or guarding; +BSx4  Extremities: WWP, no clubbing or cyanosis; no peripheral edema  Musculoskeletal: NROM x4; no joint swelling, tenderness or erythema  Vascular: 2+ radial, DP/PT pulses B/L  Dermatologic: skin warm, dry and intact; no rashes, wounds, or scars  Neurologic: AAOx3; CNII-XII grossly intact; no focal deficits  Psychiatric: affect and characteristics of appearance, verbalizations, behaviors are appropriate    MEDICATIONS  (STANDING):  azithromycin  IVPB 500 milliGRAM(s) IV Intermittent every 24 hours  buPROPion XL (24-Hour) . 450 milliGRAM(s) Oral daily  cefTRIAXone   IVPB 1000 milliGRAM(s) IV Intermittent every 24 hours  enoxaparin Injectable 40 milliGRAM(s) SubCutaneous every 24 hours  finasteride 5 milliGRAM(s) Oral daily  fluticasone propionate 50 MICROgram(s)/spray Nasal Spray 1 Spray(s) Both Nostrils two times a day  guaiFENesin  milliGRAM(s) Oral every 12 hours  levothyroxine 50 MICROGram(s) Oral daily  melatonin 3 milliGRAM(s) Oral at bedtime  modafinil 300 milliGRAM(s) Oral daily    MEDICATIONS  (PRN):  acetaminophen     Tablet .. 650 milliGRAM(s) Oral every 6 hours PRN Temp greater or equal to 38C (100.4F), Mild Pain (1 - 3)  albuterol    90 MICROgram(s) HFA Inhaler 2 Puff(s) Inhalation every 6 hours PRN Shortness of Breath and/or Wheezing  hydrocodone/homatropine Syrup 5 milliLiter(s) Oral every 6 hours PRN Cough  ibuprofen  Tablet. 400 milliGRAM(s) Oral every 6 hours PRN Moderate Pain (4 - 6)    LABS:                        13.4   4.34  )-----------( 170      ( 08 Aug 2024 06:15 )             39.4     08-08    140  |  103  |  12  ----------------------------<  94  4.0   |  25  |  1.02    Ca    8.6      08 Aug 2024 06:15  Phos  2.7     08-08  Mg     1.90     08-08    TPro  6.7  /  Alb  4.1  /  TBili  0.5  /  DBili  x   /  AST  28  /  ALT  39  /  AlkPhos  104  08-06      Urinalysis Basic - ( 08 Aug 2024 06:15 )    Color: x / Appearance: x / SG: x / pH: x  Gluc: 94 mg/dL / Ketone: x  / Bili: x / Urobili: x   Blood: x / Protein: x / Nitrite: x   Leuk Esterase: x / RBC: x / WBC x   Sq Epi: x / Non Sq Epi: x / Bacteria: x      CAPILLARY BLOOD GLUCOSE          RADIOLOGY & ADDITIONAL TESTS: Reviewed.    
[facility] Department of Hospital Medicine  Korina Coffey DO  Available on MS Teams  Pager: [phone]    Patient is a 68y old  Male who presents with a chief complaint of lethargy (07 Aug 2024 04:19)    Subjective:  Pt seen and examined at bedside resting comfortably. Feels much better. Congestion is improved, cough is barely noticeable. Wife unfortunately is COVID+ at home. Still okay to go home today, wife will pick him up later this afternoon. Say she does not need cough syrup (hycodan) on discharge.    Vital Signs Last 24 Hrs  T(C): 36.7 (09 Aug 2024 05:50), Max: 37.1 (08 Aug 2024 22:05)  T(F): 98 (09 Aug 2024 05:50), Max: 98.8 (08 Aug 2024 22:05)  HR: 84 (09 Aug 2024 05:50) (72 - 84)  BP: 139/77 (09 Aug 2024 05:50) (127/68 - 139/77)  BP(mean): --  RR: 16 (09 Aug 2024 05:50) (16 - 18)  SpO2: 95% (09 Aug 2024 05:50) (95% - 100%)    Parameters below as of 09 Aug 2024 05:50  Patient On (Oxygen Delivery Method): room air    PHYSICAL EXAM:  Constitutional: resting comfortably in bed; NAD   Head: NC/AT  Eyes: PERRL, EOMI, anicteric sclera  ENT: no nasal discharge; MMM  Neck: supple; no JVD  Respiratory: CTA B/L; no W/R/R; comfortable on RA  Cardiac: +S1/S2; RRR; no M/R/G  Gastrointestinal: soft, NT/ND; no rebound or guarding; +BSx4  Extremities: WWP, no clubbing or cyanosis; no peripheral edema  Musculoskeletal: NROM x4; no joint swelling, tenderness or erythema  Vascular: 2+ radial, DP/PT pulses B/L  Dermatologic: skin warm, dry and intact; no rashes, wounds, or scars  Neurologic: AAOx3; CNII-XII grossly intact; no focal deficits  Psychiatric: affect and characteristics of appearance, verbalizations, behaviors are appropriate    MEDICATIONS  (STANDING):  azithromycin  IVPB 500 milliGRAM(s) IV Intermittent every 24 hours  buPROPion XL (24-Hour) . 450 milliGRAM(s) Oral daily  cefTRIAXone   IVPB 1000 milliGRAM(s) IV Intermittent every 24 hours  enoxaparin Injectable 40 milliGRAM(s) SubCutaneous every 24 hours  finasteride 5 milliGRAM(s) Oral daily  fluticasone propionate 50 MICROgram(s)/spray Nasal Spray 1 Spray(s) Both Nostrils two times a day  guaiFENesin  milliGRAM(s) Oral every 12 hours  levothyroxine 50 MICROGram(s) Oral daily  melatonin 3 milliGRAM(s) Oral at bedtime  modafinil 300 milliGRAM(s) Oral daily    MEDICATIONS  (PRN):  acetaminophen     Tablet .. 650 milliGRAM(s) Oral every 6 hours PRN Temp greater or equal to 38C (100.4F), Mild Pain (1 - 3)  albuterol    90 MICROgram(s) HFA Inhaler 2 Puff(s) Inhalation every 6 hours PRN Shortness of Breath and/or Wheezing  hydrocodone/homatropine Syrup 5 milliLiter(s) Oral every 6 hours PRN Cough  ibuprofen  Tablet. 400 milliGRAM(s) Oral every 6 hours PRN Moderate Pain (4 - 6)    LABS:                        13.4   4.34  )-----------( 170      ( 08 Aug 2024 06:15 )             39.4     08-08    140  |  103  |  12  ----------------------------<  94  4.0   |  25  |  1.02    Ca    8.6      08 Aug 2024 06:15  Phos  2.7     08-08  Mg     1.90     08-08        Urinalysis Basic - ( 08 Aug 2024 06:15 )    Color: x / Appearance: x / SG: x / pH: x  Gluc: 94 mg/dL / Ketone: x  / Bili: x / Urobili: x   Blood: x / Protein: x / Nitrite: x   Leuk Esterase: x / RBC: x / WBC x   Sq Epi: x / Non Sq Epi: x / Bacteria: x      CAPILLARY BLOOD GLUCOSE          RADIOLOGY & ADDITIONAL TESTS: Reviewed.

## 2024-08-09 NOTE — DISCHARGE NOTE NURSING/CASE MANAGEMENT/SOCIAL WORK - NSDCPEFALRISK_GEN_ALL_CORE
For information on Fall & Injury Prevention, visit: [email or web address] OR  [email or web address] OR  https://www.cdc.gov/steadi/patient.html

## 2024-08-09 NOTE — DISCHARGE NOTE PROVIDER - HOSPITAL COURSE
Pt is a 69 yo M with PMH hypothyroidism and NPH (s/p  shunt 2024) p/w lethargy, cough, and F/C. Found to have COVID and febrile on arrival. Labs largely unremarkable. CXR with bibasilar opacities R>L c/f MF PNA; XR hip and pelvis neg. CT chest with band like opacity in lingula and BL lower lobes, small calcific granulomas in the superior segment LLL. Receiving CTX/azithromycin. Stable for DC home with outpt f/u.       Problem/Plan - 1:  ·  Problem: Sepsis.   ·  Plan: - pt p/w F/C, cough, and lethargy with +COVID Ag test outpt; hx complicated COVID in the past prompting ER eval  - on arrival, meeting SIRS criteria with pulmonary etiology  - COVID+  - CXR with bibasilar opacities R>L c/w MF PNA  - s/p CTX/azithromycin in ER  - c/w CTX/azithro --> cefdinir and azithro on DC to complete 5d course  - pt not interested in remdesivir  - on RA, does not meet criteria for decadron  - hycodan PRN cough  - mucinex BID  - supportive care  - CT chest with band like opacity in lingula and BL lower lobes, small calcific granulomas in the superior segment LLL.     Problem/Plan - 2:  ·  Problem: Pneumonia due to COVID-19 virus.   ·  Plan: - as above  - nasal congestion --- mucinex BID and flonase BID.     Problem/Plan - 3:  ·  Problem: Hypothyroidism.   ·  Plan: - TSH WNL  - c/w home synthroid 50mcg daily.     Problem/Plan - 4:  ·  Problem: NPH (normal pressure hydrocephalus).   ·  Plan: - s/p  shunt 6/2024.

## 2024-08-09 NOTE — DISCHARGE NOTE PROVIDER - PROVIDER TOKENS
FREE:[LAST:[Manuel],FIRST:[Rajan],PHONE:[phone]],FAX:[(   )    -],FOLLOWUP:[1 week],ESTABLISHEDPATIENT:[T]]

## 2024-08-09 NOTE — DISCHARGE NOTE PROVIDER - NSDCCPCAREPLAN_GEN_ALL_CORE_FT
PRINCIPAL DISCHARGE DIAGNOSIS  Diagnosis: 2019 novel coronavirus disease (COVID-19)  Assessment and Plan of Treatment: You came to the hospital with weakness and were found to have COVID. Your chest x-ray showed pneumonia, as did your CT chest. You received 3 days of antibiotics while in the hospital. You should continue these antibiotics on discharge STARTING 8/10. Please take them as prescribed and to completion. You have also been sent an albuterol inhaler in case you experience shortness of breath, and mucinex for your cough/congestion. Also you can use flonase for your congestion. Please follow with your primary care doctor within 1 week of discharge.

## 2024-08-09 NOTE — PROVIDER CONTACT NOTE (OTHER) - ASSESSMENT
Patient refusing AM labs    Patient is axo4 and refusing AM labs - states he is going home tomorrow so there is no need to take his labs

## 2024-08-09 NOTE — PROGRESS NOTE ADULT - PROBLEM SELECTOR PLAN 3
- TSH WNL  - c/w home synthroid 50mcg daily

## 2024-08-09 NOTE — PROGRESS NOTE ADULT - PROBLEM SELECTOR PROBLEM 4
NPH (normal pressure hydrocephalus)

## 2024-08-09 NOTE — PROGRESS NOTE ADULT - PROBLEM SELECTOR PLAN 1
- pt p/w F/C, cough, and lethargy with +COVID Ag test outpt; hx complicated COVID in the past prompting ER eval  - on arrival, meeting SIRS criteria with pulmonary etiology  - COVID+  - CXR with bibasilar opacities R>L c/w MF PNA  - s/p CTX/azithromycin in ER  - c/w CTX/azithro   - pt not interested in remdesivir  - on RA, does not meet criteria for decadron  - hycodan PRN cough  - supportive care  - f/u CT chest
- pt p/w F/C, cough, and lethargy with +COVID Ag test outpt; hx complicated COVID in the past prompting ER eval  - on arrival, meeting SIRS criteria with pulmonary etiology  - COVID+  - CXR with bibasilar opacities R>L c/w MF PNA  - s/p CTX/azithromycin in ER  - c/w CTX/azithro   - pt not interested in remdesivir  - on RA, does not meet criteria for decadron  - hycodan PRN cough  - supportive care  - CT chest with band like opacity in lingula and BL lower lobes, small calcific granulomas in the superior segment LLL
- pt p/w F/C, cough, and lethargy with +COVID Ag test outpt; hx complicated COVID in the past prompting ER eval  - on arrival, meeting SIRS criteria with pulmonary etiology  - COVID+  - CXR with bibasilar opacities R>L c/w MF PNA  - s/p CTX/azithromycin in ER  - c/w CTX/azithro --> cefdinir and azithro on DC to complete 5d course  - pt not interested in remdesivir  - on RA, does not meet criteria for decadron  - hycodan PRN cough  - mucinex BID  - supportive care  - CT chest with band like opacity in lingula and BL lower lobes, small calcific granulomas in the superior segment LLL

## 2024-08-09 NOTE — DISCHARGE NOTE NURSING/CASE MANAGEMENT/SOCIAL WORK - PATIENT PORTAL LINK FT
You can access the FollowMyHealth Patient Portal offered by [facility] by registering at the following website: [email or web address] By joining [facility]’s FollowMyHealth portal, you will also be able to view your health information using other applications (apps) compatible with our system.

## 2024-08-09 NOTE — DISCHARGE NOTE PROVIDER - CARE PROVIDER_API CALL
Rajan Jackson  Phone: [phone]  Fax: (   )    -  Established Patient  Follow Up Time: 1 week

## 2024-08-09 NOTE — DISCHARGE NOTE PROVIDER - ATTENDING DISCHARGE PHYSICAL EXAMINATION:
VITAL SIGNS:  T(C): 36.7 (08-09-24 @ 05:50), Max: 37.1 (08-08-24 @ 22:05)  T(F): 98 (08-09-24 @ 05:50), Max: 98.8 (08-08-24 @ 22:05)  HR: 84 (08-09-24 @ 05:50) (72 - 84)  BP: 139/77 (08-09-24 @ 05:50) (127/68 - 139/77)  BP(mean): --  RR: 16 (08-09-24 @ 05:50) (16 - 18)  SpO2: 95% (08-09-24 @ 05:50) (95% - 100%)  Wt(kg): --    PHYSICAL EXAM:  Constitutional: resting comfortably in bed; NAD  Head: NC/AT  Eyes: PERRL, EOMI, anicteric sclera  ENT: no nasal discharge; MMM  Neck: supple; no JVD  Respiratory: CTA B/L; no W/R/R  Cardiac: +S1/S2; RRR; no M/R/G  Gastrointestinal: soft, NT/ND; no rebound or guarding; +BSx4  Extremities: WWP, no clubbing or cyanosis; no peripheral edema  Musculoskeletal: NROM x4; no joint swelling, tenderness or erythema  Vascular: 2+ radial, DP/PT pulses B/L  Dermatologic: skin warm, dry and intact; no rashes, wounds, or scars  Neurologic: AAOx3; CNII-XII grossly intact; no focal deficits  Psychiatric: affect and characteristics of appearance, verbalizations, behaviors are appropriate

## 2024-08-09 NOTE — PROGRESS NOTE ADULT - PROBLEM SELECTOR PLAN 2
- as above  - nasal congestion --- mucinex BID and flonase BID
- as above
- as above  - nasal congestion --- mucinex BID and flonase BID

## 2024-08-12 LAB
CULTURE RESULTS: SIGNIFICANT CHANGE UP
CULTURE RESULTS: SIGNIFICANT CHANGE UP
SPECIMEN SOURCE: SIGNIFICANT CHANGE UP
SPECIMEN SOURCE: SIGNIFICANT CHANGE UP

## 2025-07-02 NOTE — ED PROCEDURE NOTE - PROCEDURE SUPERVISED BY
Caller: LINDA WITH [employer]    Relationship: Payer    Best call back number: [phone]    What form or medical record are you requesting:[employer] SUPPLEMENTAL BENEFIT FOR THE CHRONICALLY ILL    Who is requesting this form or medical record from you: [employer]     How would you like to receive the form or medical records (pick-up, mail, fax): FAX: [phone]  ALSO ABLE TO ACCES THRU [employer]    Timeframe paperwork needed: AS SOON AS POSSIBLE    Additional notes: PLEASE ACCESS A NEW [employer] FORM TO COMPLETE AT NEW PATIENT APPOINTMENT 07/03/25 THRU [employer]. AND RETURN THE SAME WAY OR FAX BACK TO THE NUMBER LISTED ABOVE.     Dr. Ethan Whelan